# Patient Record
Sex: MALE | URBAN - METROPOLITAN AREA
[De-identification: names, ages, dates, MRNs, and addresses within clinical notes are randomized per-mention and may not be internally consistent; named-entity substitution may affect disease eponyms.]

---

## 2020-02-28 ENCOUNTER — THERAPY VISIT (OUTPATIENT)
Dept: OCCUPATIONAL THERAPY | Facility: CLINIC | Age: 65
End: 2020-02-28
Payer: COMMERCIAL

## 2020-02-28 DIAGNOSIS — M25.642 STIFFNESS OF FINGER JOINT OF LEFT HAND: ICD-10-CM

## 2020-02-28 PROCEDURE — 97110 THERAPEUTIC EXERCISES: CPT | Mod: GO | Performed by: OCCUPATIONAL THERAPIST

## 2020-02-28 PROCEDURE — 97140 MANUAL THERAPY 1/> REGIONS: CPT | Mod: GO | Performed by: OCCUPATIONAL THERAPIST

## 2020-02-28 PROCEDURE — 97165 OT EVAL LOW COMPLEX 30 MIN: CPT | Mod: GO | Performed by: OCCUPATIONAL THERAPIST

## 2020-02-28 PROCEDURE — 97018 PARAFFIN BATH THERAPY: CPT | Mod: GO | Performed by: OCCUPATIONAL THERAPIST

## 2020-02-28 NOTE — PROGRESS NOTES
Hand Therapy Initial Evaluation    Current Date:  2/28/2020  Referring Physician: Maria R Augustine    Diagnosis: Left index finer stiffness  DOI: 10/2019  DOS: 12/2019  Procedure:  I & D  Post: 4 months ago    Subjective:  Tae Nunn is a 64 year old right hand dominant male.    Patient reports symptoms of pain, stiffness/loss of motion, weakness/loss of strength, edema, numbness and tingling  of the left index finger which occurred due to an injury sustained from a wood sliver. Since onset symptoms are Unchanged  Special tests:  x-ray.  [facility]    General health as reported by patient is good.  Pertinent medical history includes:Diabetes, Numbness/Tingling  Medical allergies:none.  Surgical history: orthopedic: vertibrae.  Medication history: High Blood Pressure, Diabetes.    Occupational Profile Information:  Current occupation is   Currently working in normal job without restrictions  Job Tasks: Computer Work, Prolonged Sitting  Prior functional level:  no limitations  Barriers include:none  Mobility: No difficulty  Transportation: drives  Leisure activities/hobbies: [employer]    Upper Extremity Functional Index Score:  SCORE:   Column Totals: /80: 75   (A lower score indicates greater disability.)    Objective:  Pain Level (Scale 0-10)   2/28/2020   At Rest 0/10   With Use 3/10     Pain Description  Date 2/28/2020   Location index finger PIP joint   Pain Quality Dull and cramping   Frequency intermittent     Pain is worst  daytime   Exacerbated by  bumping, use of finger   Relieved by rest   Progression unchanged     Edema  Mild  Edema - Measured circumferentially in cm  Date 2/28/20         Right Left Right Left Right Left Right Left Right Left Right Left   Distal Wrist Crease                P1 7.6 7.8             PIP 7.3 7.5              P2 6.4 6.7             DIP 6.0 6.4                 Sensation   Decreased Median Nerve distribution per pt report.   Localized numbness at the radial P2 level    ROM  Index Finger 2/28/2020 2/28/2020   AROM (PROM) Right Left   MCP /70 /60   PIP /85 /70   DIP /55 -15/40   SHEPPARD       Strength   Painfree (Measured in pounds)  Pain Report:  + mild    ++ moderate    +++ severe    2/28/2020 2/28/2020   Trials Right Left   1  2  3 67  65  66 46  38  37   Average 66 40     Lat Pinch 2/28/2020 2/28/2020   Trials Right Left   1  2  3 19 13   Average       3 Pt Pinch 2/28/2020 2/28/2020   Trials Right Left   1  2  3 19 8   Average       Tip Pinch 2/28/2020 2/28/2020   Trials Right Left   1  2  3 15 10   Average         Palpation  Pain Report:  - none    + mild    ++ moderate    +++ severe     2/28/20      Scar site (radial P2) ++      Dorsal DIP +                                     Assessment:  Patient presents with symptoms consistent with diagnosis of left index finger stiffness,  with surgical  intervention.     Patient's limitations or Problem List includes:  Pain, Decreased ROM/motion, Increased edema, Weakness, Sensory disturbance, Adherent scarring, Decreased , Decreased pinch, Decreased coordination, Decreased dexterity and Adherence in connective tissue of the left index finger which interferes with the patient's ability to perform Self Care Tasks (dressing), Work Tasks, Recreational Activities and Household Chores as compared to previous level of function.    Rehab Potential:  Excellent - Return to full activity, no limitations    Patient will benefit from skilled Occupational Therapy to increase ROM, flexibility, overall strength,  strength, pinch strength, coordination, dexterity and sensation and decrease pain, edema and adherence of scarring to return to previous activity level and resume normal daily tasks and to reach their rehab potential.    Barriers to Learning:  No barrier    Communication Issues:  Patient appears to be able to clearly communicate and understand verbal and written communication and follow  directions correctly.    Chart Review: Brief history including review of medical and/or therapy records relating to the presenting problem and Simple history review with patient    Identified Performance Deficits: dressing, home establishment and management, meal preparation and cleanup, work and leisure activities    Assessment of Occupational Performance:  5 or more Performance Deficits    Clinical Decision Making (Complexity): Low complexity    Treatment Explanation:  The following has been discussed with the patient:  RX ordered/plan of care  Anticipated outcomes  Possible risks and side effects    Plan:  Frequency:  1 X week, once daily  Duration:  for 10 weeks    Treatment Plan:   Modalities:  US, Fluidotherapy and Paraffin  Therapeutic Exercise:  AROM, PROM, Tendon Gliding, Blocking, Reverse Blocking, Extensor Tracking and Isotonics  Neuromuscular: Desensitization  Manual Techniques:  Joint mobilization, Scar mobilization, Myofascial release and Manual edema mobilization    Discharge Plan:  Achieve all LTG.  Independent in home treatment program.  Reach maximal therapeutic benefit.    Home Exercise Program:  A/PROM of finger  Scar massage  Coban for edema  Desensitization    Next Visit:  Paraffin  A/PROM of finger  Desensitization  Scar massage  Scar massage

## 2020-02-28 NOTE — LETTER
[facility]  [address]  [place]   [facility] [address]  [phone]    2020    Re: Tae Nunn   :   1955  MRN:  [id number]   REFERRING PHYSICIAN:   Maria R Augustine    [facility]  Date of Initial Evaluation:  20  Visits:  Rxs Used: 1  Reason for Referral:  Stiffness of finger joint of left hand    EVALUATION SUMMARY    Hand Therapy Initial Evaluation  Current Date:  2020  Referring Physician: Maria R Augustine  Diagnosis: Left index finer stiffness  DOI: 10/2019  DOS: 2019  Procedure:  I & D  Post: 4 months ago    Subjective:  Tae Nunn is a 64 year old right hand dominant male. Patient reports symptoms of pain, stiffness/loss of motion, weakness/loss of strength, edema, numbness and tingling  of the left index finger which occurred due to an injury sustained from a wood sliver. Since onset symptoms are Unchanged  Special tests:  x-ray. [facility]        General health as reported by patient is good.  Pertinent medical history includes:Diabetes, Numbness/Tingling  Medical allergies:none.  Surgical history: orthopedic: vertibrae.  Medication history: High Blood Pressure, Diabetes.    Occupational Profile Information:  Current occupation is   Currently working in normal job without restrictions  Job Tasks: Computer Work, Prolonged Sitting  Prior functional level:  no limitations  Barriers include:none  Mobility: No difficulty  Transportation: drives  Leisure activities/hobbies: woodworking    Upper Extremity Functional Index Score:  SCORE:   Column Totals: /80: 75   (A lower score indicates greater disability.)  Re: Tae Nunn   :   1955    Objective:  Pain Level (Scale 0-10)   2020   At Rest 0/10   With Use 3/10     Pain Description  Date 2020   Location index finger PIP joint   Pain Quality Dull and cramping   Frequency intermittent     Pain is worst  daytime    Exacerbated by  bumping, use of finger   Relieved by rest   Progression unchanged     Edema  Mild  Edema - Measured circumferentially in cm  Date 20         Right Left Right Left Right Left Right Left Right Left Right Left   Distal Wrist Crease                P1 7.6 7.8             PIP 7.3 7.5              P2 6.4 6.7             DIP 6.0 6.4               Sensation   Decreased Median Nerve distribution per pt report.  Localized numbness at the radial P2 level    ROM  Index Finger 2020   AROM (PROM) Right Left   MCP /70 /60   PIP /85 /70   DIP /55 -15/40   SHEPPARD       Strength   Painfree (Measured in pounds)  Pain Report:  + mild    ++ moderate    +++ severe    2020   Trials Right Left   1  2  3 67  65  66 46  38  37   Average 66 40     Re: Tae Nunn   :   1955    Lat Pinch 2020   Trials Right Left   1  2  3 19 13   Average       3 Pt Pinch 2020   Trials Right Left   1  2  3 19 8   Average       Tip Pinch 2020   Trials Right Left   1  2  3 15 10   Average       Palpation  Pain Report:  - none    + mild    ++ moderate    +++ severe     20      Scar site (radial P2) ++      Dorsal DIP +         Assessment:  Patient presents with symptoms consistent with diagnosis of left index finger stiffness,  with surgical  intervention.     Patient's limitations or Problem List includes:  Pain, Decreased ROM/motion, Increased edema, Weakness, Sensory disturbance, Adherent scarring, Decreased , Decreased pinch, Decreased coordination, Decreased dexterity and Adherence in connective tissue of the left index finger which interferes with the patient's ability to perform Self Care Tasks (dressing), Work Tasks, Recreational Activities and Household Chores as compared to previous level of function.    Rehab Potential:  Excellent - Return to full activity, no limitations    Patient will benefit from skilled Occupational Therapy to  increase ROM, flexibility, overall strength,  strength, pinch strength, coordination, dexterity and sensation and decrease pain, edema and adherence of scarring to return to previous activity level and resume normal daily tasks and to reach their rehab potential.    Barriers to Learning:  No barrier    Communication Issues:  Patient appears to be able to clearly communicate and understand verbal and written communication and follow directions correctly.      Re: Tae Nunn   :   1955    Chart Review: Brief history including review of medical and/or therapy records relating to the presenting problem and Simple history review with patient    Identified Performance Deficits: dressing, home establishment and management, meal preparation and cleanup, work and leisure activities    Assessment of Occupational Performance:  5 or more Performance Deficits    Clinical Decision Making (Complexity): Low complexity    Treatment Explanation:  The following has been discussed with the patient:  RX ordered/plan of care  Anticipated outcomes  Possible risks and side effects    Plan:  Frequency:  1 X week, once daily  Duration:  for 10 weeks    Treatment Plan:   Modalities:  US, Fluidotherapy and Paraffin  Therapeutic Exercise:  AROM, PROM, Tendon Gliding, Blocking, Reverse Blocking, Extensor Tracking and Isotonics  Neuromuscular: Desensitization  Manual Techniques:  Joint mobilization, Scar mobilization, Myofascial release and Manual edema mobilization  Discharge Plan:  Achieve all LTG.  Independent in home treatment program.  Reach maximal therapeutic benefit.    Home Exercise Program:  A/PROM of finger  Scar massage  Coban for edema  Desensitization    Next Visit:  Paraffin  A/PROM of finger  Desensitization  Scar massage  Scar massage    Thank you for your referral.    INQUIRIES  Therapist: NOY Thomas/L, CHT   [facility]  [address]  [place]   [facility] [address]  Phone:  [phone]  Fax: [phone]

## 2020-04-06 PROBLEM — M25.642 STIFFNESS OF FINGER JOINT OF LEFT HAND: Status: RESOLVED | Noted: 2020-02-28 | Resolved: 2020-04-06

## 2020-04-06 NOTE — PROGRESS NOTES
Discharge Summary - Hand Therapy    Patient did not return to therapy after the initial evaluation.   We will assume that patient's goals were met.    D/C from [facility].